# Patient Record
Sex: FEMALE | Race: WHITE | Employment: FULL TIME | RURAL
[De-identification: names, ages, dates, MRNs, and addresses within clinical notes are randomized per-mention and may not be internally consistent; named-entity substitution may affect disease eponyms.]

---

## 2024-06-28 ENCOUNTER — OFFICE VISIT (OUTPATIENT)
Age: 24
End: 2024-06-28
Payer: COMMERCIAL

## 2024-06-28 VITALS
SYSTOLIC BLOOD PRESSURE: 128 MMHG | DIASTOLIC BLOOD PRESSURE: 84 MMHG | RESPIRATION RATE: 18 BRPM | WEIGHT: 277.4 LBS | BODY MASS INDEX: 51.05 KG/M2 | OXYGEN SATURATION: 98 % | HEART RATE: 99 BPM | TEMPERATURE: 97.5 F | HEIGHT: 62 IN

## 2024-06-28 DIAGNOSIS — R30.0 DYSURIA: Primary | ICD-10-CM

## 2024-06-28 LAB
BILIRUBIN, URINE, POC: NEGATIVE
BLOOD URINE, POC: NEGATIVE
GLUCOSE URINE, POC: NEGATIVE
KETONES, URINE, POC: NEGATIVE
LEUKOCYTE ESTERASE, URINE, POC: NEGATIVE
NITRITE, URINE, POC: NEGATIVE
PH, URINE, POC: 7.5 (ref 4.6–8)
PROTEIN,URINE, POC: NEGATIVE
SPECIFIC GRAVITY, URINE, POC: 1.02 (ref 1–1.03)
URINALYSIS CLARITY, POC: CLEAR
URINALYSIS COLOR, POC: YELLOW
UROBILINOGEN, POC: NORMAL

## 2024-06-28 PROCEDURE — 81001 URINALYSIS AUTO W/SCOPE: CPT | Performed by: FAMILY MEDICINE

## 2024-06-28 PROCEDURE — 99213 OFFICE O/P EST LOW 20 MIN: CPT | Performed by: FAMILY MEDICINE

## 2024-06-28 RX ORDER — BUTALBITAL, ACETAMINOPHEN AND CAFFEINE 50; 325; 40 MG/1; MG/1; MG/1
TABLET ORAL
COMMUNITY
Start: 2024-06-26

## 2024-06-28 SDOH — ECONOMIC STABILITY: INCOME INSECURITY: HOW HARD IS IT FOR YOU TO PAY FOR THE VERY BASICS LIKE FOOD, HOUSING, MEDICAL CARE, AND HEATING?: NOT HARD AT ALL

## 2024-06-28 SDOH — ECONOMIC STABILITY: TRANSPORTATION INSECURITY
IN THE PAST 12 MONTHS, HAS LACK OF TRANSPORTATION KEPT YOU FROM MEETINGS, WORK, OR FROM GETTING THINGS NEEDED FOR DAILY LIVING?: NO

## 2024-06-28 SDOH — ECONOMIC STABILITY: HOUSING INSECURITY
IN THE LAST 12 MONTHS, WAS THERE A TIME WHEN YOU DID NOT HAVE A STEADY PLACE TO SLEEP OR SLEPT IN A SHELTER (INCLUDING NOW)?: NO

## 2024-06-28 SDOH — ECONOMIC STABILITY: FOOD INSECURITY: WITHIN THE PAST 12 MONTHS, THE FOOD YOU BOUGHT JUST DIDN'T LAST AND YOU DIDN'T HAVE MONEY TO GET MORE.: NEVER TRUE

## 2024-06-28 SDOH — ECONOMIC STABILITY: FOOD INSECURITY: WITHIN THE PAST 12 MONTHS, YOU WORRIED THAT YOUR FOOD WOULD RUN OUT BEFORE YOU GOT MONEY TO BUY MORE.: NEVER TRUE

## 2024-06-28 ASSESSMENT — ENCOUNTER SYMPTOMS
RHINORRHEA: 0
DIARRHEA: 0
SHORTNESS OF BREATH: 0
SORE THROAT: 0
SINUS PAIN: 0
ABDOMINAL PAIN: 0
ANAL BLEEDING: 0
ABDOMINAL DISTENTION: 0
COUGH: 0
CHEST TIGHTNESS: 0
WHEEZING: 0
VOMITING: 0
NAUSEA: 0
BLOOD IN STOOL: 0
BACK PAIN: 0
CONSTIPATION: 0
SINUS PRESSURE: 0

## 2024-06-28 NOTE — PROGRESS NOTES
Assessment & Plan   1. Dysuria  -     AMB POC URINALYSIS DIP STICK AUTO W/ MICRO       No follow-ups on file.       Subjective   Elidia Alvarez (:  2000) is a 24 y.o. female,Established patient, here for evaluation of the following chief complaint(s):  Migraine (Patient has always had migraines but now is worse that she is pregnant and unable to take medication. Saturday had bright spots and blurred vision and is concerned )      Elidia Alvarez is a 24 y.o. female presents to discuss symptoms of headache, nasal congestion, and allergy flare up.     She is 19 weeks pregnant. Has a follow up with OB/GYN on 2024    Migraine   Pertinent negatives include no abdominal pain, back pain, coughing, fever, nausea, neck pain, rhinorrhea, sinus pressure, sore throat or vomiting.       Review of Systems   Constitutional:  Negative for activity change, appetite change, fatigue and fever.   HENT:  Negative for congestion, postnasal drip, rhinorrhea, sinus pressure, sinus pain, sneezing and sore throat.    Respiratory:  Negative for cough, chest tightness, shortness of breath and wheezing.    Cardiovascular:  Negative for chest pain, palpitations and leg swelling.   Gastrointestinal:  Negative for abdominal distention, abdominal pain, anal bleeding, blood in stool, constipation, diarrhea, nausea and vomiting.   Endocrine: Negative for cold intolerance, heat intolerance, polydipsia, polyphagia and polyuria.   Genitourinary:  Negative for dyspareunia, genital sores, hematuria, menstrual problem, pelvic pain, vaginal bleeding, vaginal discharge and vaginal pain.   Musculoskeletal:  Negative for arthralgias, back pain, gait problem, joint swelling and neck pain.   Skin:  Negative for pallor, rash and wound.   Allergic/Immunologic: Negative for environmental allergies, food allergies and immunocompromised state.   Hematological:  Negative for adenopathy. Does not bruise/bleed easily.

## 2024-06-28 NOTE — PROGRESS NOTES
Identified pt with two pt identifiers(name and ).    Chief Complaint   Patient presents with    Migraine     Patient has always had migraines but now is worse that she is pregnant and unable to take medication. Saturday had bright spots and blurred vision and is concerned         Health Maintenance Due   Topic    Varicella vaccine (2 of 2 - 2-dose childhood series)    HIV screen     HPV vaccine (2 - 3-dose series)    COVID-19 Vaccine ( season)       Wt Readings from Last 3 Encounters:   24 126.1 kg (278 lb)   23 120.2 kg (265 lb)   23 120.7 kg (266 lb 3.2 oz)     Temp Readings from Last 3 Encounters:   24 98.5 °F (36.9 °C)   23 98.4 °F (36.9 °C) (Oral)   23 98.1 °F (36.7 °C)     BP Readings from Last 3 Encounters:   24 117/85   23 128/77   23 (!) 150/90     Pulse Readings from Last 3 Encounters:   24 98   23 71   23 83           Depression Screening:  :         2024     1:38 PM 2023    11:31 AM 3/29/2023     9:30 AM 2023     3:38 PM 10/24/2022     2:48 PM 10/10/2022    10:00 AM 3/14/2022     4:14 PM   PHQ-9 Questionaire   Little interest or pleasure in doing things 0 1 0 1 1 0 0   Feeling down, depressed, or hopeless 0 1 0 1 1 1 0   Trouble falling or staying asleep, or sleeping too much 0   3      Feeling tired or having little energy 0   3      Poor appetite or overeating 0   1      Feeling bad about yourself - or that you are a failure or have let yourself or your family down 0   0      Trouble concentrating on things, such as reading the newspaper or watching television 0   0      Moving or speaking so slowly that other people could have noticed. Or the opposite - being so fidgety or restless that you have been moving around a lot more than usual 0   0      Thoughts that you would be better off dead, or of hurting yourself in some way 0         PHQ-9 Total Score 0 2 0 9 2 1 0   If you checked off any problems, how

## 2024-07-17 DIAGNOSIS — E28.2 PCOS (POLYCYSTIC OVARIAN SYNDROME): ICD-10-CM

## 2024-07-17 RX ORDER — METFORMIN HYDROCHLORIDE 500 MG/1
TABLET, EXTENDED RELEASE ORAL
Qty: 180 TABLET | Refills: 0 | Status: SHIPPED | OUTPATIENT
Start: 2024-07-17

## 2024-08-21 ENCOUNTER — OFFICE VISIT (OUTPATIENT)
Age: 24
End: 2024-08-21
Payer: COMMERCIAL

## 2024-08-21 VITALS
DIASTOLIC BLOOD PRESSURE: 76 MMHG | BODY MASS INDEX: 52.89 KG/M2 | HEART RATE: 76 BPM | SYSTOLIC BLOOD PRESSURE: 120 MMHG | RESPIRATION RATE: 17 BRPM | WEIGHT: 287.4 LBS | TEMPERATURE: 97.7 F | OXYGEN SATURATION: 98 % | HEIGHT: 62 IN

## 2024-08-21 DIAGNOSIS — Z3A.27 27 WEEKS GESTATION OF PREGNANCY: ICD-10-CM

## 2024-08-21 DIAGNOSIS — J02.9 SORE THROAT: Primary | ICD-10-CM

## 2024-08-21 DIAGNOSIS — R59.9 LYMPH NODE ENLARGEMENT: ICD-10-CM

## 2024-08-21 LAB
GROUP A STREP ANTIGEN, POC: NEGATIVE
VALID INTERNAL CONTROL, POC: NORMAL

## 2024-08-21 PROCEDURE — 87880 STREP A ASSAY W/OPTIC: CPT | Performed by: INTERNAL MEDICINE

## 2024-08-21 PROCEDURE — 99213 OFFICE O/P EST LOW 20 MIN: CPT | Performed by: INTERNAL MEDICINE

## 2024-08-21 NOTE — PROGRESS NOTES
[facility]   Acute Visit Progress Note  Patient: Elidia Alvarez  2000, 24 y.o., female  Encounter Date: 8/21/24    CHIEF COMPLAINT:  Chief Complaint   Patient presents with    URI     Headache, sore throat and pain on the left side of her face since Sunday. Patient is 27 weeks pregnant         ASSESSMENT & PLAN:    ICD-10-CM    1. Sore throat  J02.9 AMB POC RAPID STREP A      2. Lymph node enlargement  R59.9       3. 27 weeks gestation of pregnancy  Z3A.27         Strep was negative. Symptoms are suggestive of viral illness and mild GIDEON.   Will treat supportively.   I discussed that a viral illness usually starts with mild sore throat. It then can lead to cough and congestion with nasal congestion being predominant. The cough and congestion can last for about 2-weeks. However, if fever curve increases 4-5 days after onset of symptoms, then the dynamics have changed and it could be a bacterial infection. That would be the reason to start antibiotics. She verbalized understanding the plan.     I have discussed the diagnosis with the patient and the intended treatment plan as seen in the above orders. The patient has received an after-visit summary and questions were answered concerning future plans. Asked to return should symptoms worsen or not improve with treatment. Any pending labs and studies will be relayed to patient when they become available.     Pt verbalizes understanding of plan of care and denies further questions or concerns at this time    Return if symptoms worsen or fail to improve.    SUBJECTIVE  Elidia Alvarez is a 24 y.o. female presenting today for onset of URI symptoms and swelling along the right side of her neck. Feels like she has a sore throat as well. She denies fever or chills. Worried about strep throat.     Review of Systems   Constitutional:  Positive for activity change.   HENT:  Positive for sore throat.    Eyes: Negative.    Respiratory: Negative.

## 2024-08-21 NOTE — PATIENT INSTRUCTIONS
I discussed that a viral illness usually starts with mild sore throat. It then can lead to cough and congestion with nasal congestion being predominant. The cough and congestion can last for about 2-weeks. However, if fever curve increases 4-5 days after onset of symptoms, then the dynamics have changed and it could be a bacterial infection. That would be the reason to start antibiotics. She verbalized understanding the plan.

## 2024-08-21 NOTE — PROGRESS NOTES
Identified pt with two pt identifiers(name and ).    Chief Complaint   Patient presents with    URI     Headache, sore throat and pain on the left side of her face since . Patient is 27 weeks pregnant         Health Maintenance Due   Topic    Varicella vaccine (2 of 2 - 2-dose childhood series)    HIV screen     HPV vaccine (2 - 3-dose series)    COVID-19 Vaccine ( season)    Flu vaccine (1)    Tdap Vaccine during Pregnancy        Wt Readings from Last 3 Encounters:   24 125.8 kg (277 lb 6.4 oz)   24 126.1 kg (278 lb)   23 120.2 kg (265 lb)     Temp Readings from Last 3 Encounters:   24 97.5 °F (36.4 °C) (Temporal)   24 98.5 °F (36.9 °C)   23 98.4 °F (36.9 °C) (Oral)     BP Readings from Last 3 Encounters:   24 128/84   24 117/85   23 128/77     Pulse Readings from Last 3 Encounters:   24 99   24 98   23 71           Depression Screening:  :         2024     1:38 PM 2023    11:31 AM 3/29/2023     9:30 AM 2023     3:38 PM 10/24/2022     2:48 PM 10/10/2022    10:00 AM 3/14/2022     4:14 PM   PHQ-9 Questionaire   Little interest or pleasure in doing things 0 1 0 1 1 0 0   Feeling down, depressed, or hopeless 0 1 0 1 1 1 0   Trouble falling or staying asleep, or sleeping too much 0   3      Feeling tired or having little energy 0   3      Poor appetite or overeating 0   1      Feeling bad about yourself - or that you are a failure or have let yourself or your family down 0   0      Trouble concentrating on things, such as reading the newspaper or watching television 0   0      Moving or speaking so slowly that other people could have noticed. Or the opposite - being so fidgety or restless that you have been moving around a lot more than usual 0   0      Thoughts that you would be better off dead, or of hurting yourself in some way 0         PHQ-9 Total Score 0 2 0 9 2 1 0   If you checked off any problems, how

## 2024-08-22 ASSESSMENT — ENCOUNTER SYMPTOMS
GASTROINTESTINAL NEGATIVE: 1
EYES NEGATIVE: 1
SORE THROAT: 1
RESPIRATORY NEGATIVE: 1
ALLERGIC/IMMUNOLOGIC NEGATIVE: 1

## 2024-10-04 DIAGNOSIS — E28.2 PCOS (POLYCYSTIC OVARIAN SYNDROME): ICD-10-CM

## 2024-10-14 RX ORDER — METFORMIN HCL 500 MG
TABLET, EXTENDED RELEASE 24 HR ORAL
Qty: 180 TABLET | Refills: 0 | Status: SHIPPED | OUTPATIENT
Start: 2024-10-14

## 2024-12-18 ENCOUNTER — TELEMEDICINE (OUTPATIENT)
Age: 24
End: 2024-12-18
Payer: COMMERCIAL

## 2024-12-18 DIAGNOSIS — G44.52 NEW DAILY PERSISTENT HEADACHE: Primary | ICD-10-CM

## 2024-12-18 PROCEDURE — 99214 OFFICE O/P EST MOD 30 MIN: CPT | Performed by: FAMILY MEDICINE

## 2024-12-18 RX ORDER — ATOGEPANT 60 MG/1
1 TABLET ORAL DAILY
Qty: 30 TABLET | Refills: 3 | Status: SHIPPED | OUTPATIENT
Start: 2024-12-18

## 2024-12-18 RX ORDER — PROPRANOLOL HYDROCHLORIDE 60 MG/1
60 CAPSULE, EXTENDED RELEASE ORAL DAILY
Qty: 30 CAPSULE | Refills: 3 | Status: SHIPPED | OUTPATIENT
Start: 2024-12-18

## 2024-12-18 RX ORDER — RIMEGEPANT SULFATE 75 MG/75MG
1 TABLET, ORALLY DISINTEGRATING ORAL DAILY
Qty: 30 TABLET | Refills: 3 | Status: SHIPPED | OUTPATIENT
Start: 2024-12-18

## 2024-12-18 NOTE — PROGRESS NOTES
better off dead, or of hurting yourself in some way 0         PHQ-9 Total Score 0 2 0 9 2 1 0   If you checked off any problems, how difficult have these problems made it for you to do your work, take care of things at home, or get along with other people? 0              Fall Risk Assessment:  :          No data to display                 Abuse Screening:  :          No data to display                 Coordination of Care Questionnaire:  :     \"Have you been to the ER, urgent care clinic since your last visit?  Hospitalized since your last visit?\"    NO    “Have you seen or consulted any other health care providers outside our system since your last visit?”    NO        Click Here for Release of Records Request

## 2025-01-10 DIAGNOSIS — E28.2 PCOS (POLYCYSTIC OVARIAN SYNDROME): ICD-10-CM

## 2025-01-10 RX ORDER — METFORMIN HYDROCHLORIDE 500 MG/1
TABLET, EXTENDED RELEASE ORAL
Qty: 180 TABLET | Refills: 0 | Status: SHIPPED | OUTPATIENT
Start: 2025-01-10

## 2025-02-14 ENCOUNTER — TELEMEDICINE (OUTPATIENT)
Age: 25
End: 2025-02-14
Payer: COMMERCIAL

## 2025-02-14 DIAGNOSIS — G43.011 MIGRAINE WITHOUT AURA, INTRACTABLE, WITH STATUS MIGRAINOSUS: Primary | ICD-10-CM

## 2025-02-14 PROCEDURE — 99213 OFFICE O/P EST LOW 20 MIN: CPT | Performed by: NURSE PRACTITIONER

## 2025-02-14 NOTE — PROGRESS NOTES
[facility]  [facility]  [facility]UROLOGY [facility]INIC   [address] [place] [address]   [phone] Office   [phone] Fax           Date:  25     Name:  BECCA ALVAREZ  :  2000  MRN:  [id number]     PCP:  Celestina Chu MD    Becca Alvarez is a 24 y.o. female who was seen by synchronous (real-time) audio-video technology on 2025 for Migraine    Subjective:   Last visit was in . At the time, she did just get  and they were looking to start a family right away.  She is now three months post partum. She states that during pregnancy, she did well with regard to migraine. About a week before delivery, she started having headaches. This was thought to be related high blood pressure. She was given magnesium IV which seemed to help but when this was stopped, the headaches started to come back.  She was having migraines daily. About six weeks ago, she was given Qulipta. She is now down to only one migraine a week and has only had two migraines that had her reaching for the Nurtec. The Nurtec does effectively abort the migraine.     Current Outpatient Medications   Medication Sig    metFORMIN (GLUCOPHAGE-XR) 500 MG extended release tablet TAKE 2 TABLETS BY MOUTH DAILY WITH DINNER    rimegepant sulfate (NURTEC) 75 MG TBDP Take 1 tablet by mouth Daily    Atogepant (QULIPTA) 60 MG TABS Take 1 tablet by mouth daily    propranolol (INDERAL LA) 60 MG extended release capsule Take 1 capsule by mouth daily    Azelaic Acid 15 % GEL APPLY TOPICALLY TO FACE EVERY MORNING    clindamycin (CLINDAGEL) 1 % gel APPLY TOPICALLY TO FACE EVERY NIGHT    azelastine (ASTELIN) 0.1 % nasal spray 1 spray by Nasal route 2 times daily    cetirizine (ZYRTEC) 10 MG tablet Take 1 tablet by mouth every evening    Citalopram Hydrobromide 30 MG CAPS Take 20 mg by mouth daily     No current facility-administered medications for this visit.

## 2025-03-25 ENCOUNTER — TELEPHONE (OUTPATIENT)
Age: 25
End: 2025-03-25

## 2025-03-25 NOTE — TELEPHONE ENCOUNTER
Patient requested a refill from her pharmacy, her bottle says that she has 3 refills left before December of 2025. But refill was denied, due to her needing a PA. She is completely out of this medication and needs a refill asap. She is requesting a PA be submitted asap.     Atogepant 60 MG      [facility] AT [address] [place] [phone] - F [phone]

## 2025-04-03 ENCOUNTER — TELEPHONE (OUTPATIENT)
Age: 25
End: 2025-04-03

## 2025-04-03 NOTE — TELEPHONE ENCOUNTER
Atogepant (QULIPTA) 60 MG TABS     Pt wants to know what is the status of the PA for this? As she has been out of medication for awhile      [facility] at [place], [place] - [address] [place] [phone] - F [phone]

## 2025-04-07 DIAGNOSIS — G44.52 NEW DAILY PERSISTENT HEADACHE: ICD-10-CM

## 2025-04-08 ENCOUNTER — TELEPHONE (OUTPATIENT)
Age: 25
End: 2025-04-08

## 2025-04-08 RX ORDER — ATOGEPANT 60 MG/1
1 TABLET ORAL DAILY
Qty: 30 TABLET | Refills: 3 | Status: SHIPPED | OUTPATIENT
Start: 2025-04-08

## 2025-04-08 NOTE — TELEPHONE ENCOUNTER
Atogepant (QULIPTA) 60 MG TABS        [facility] at [address] [place] [phone] - [place] [phone]       This has been going on for over 2 weeks.   Pt is wondering the status of the Prior Auth. Please let me know so I can contact patient as she has been out of medication for weeks.

## 2025-04-23 DIAGNOSIS — E28.2 PCOS (POLYCYSTIC OVARIAN SYNDROME): ICD-10-CM

## 2025-04-24 RX ORDER — METFORMIN HYDROCHLORIDE 500 MG/1
TABLET, EXTENDED RELEASE ORAL
Qty: 180 TABLET | Refills: 0 | Status: SHIPPED | OUTPATIENT
Start: 2025-04-24

## 2025-05-06 ENCOUNTER — TELEPHONE (OUTPATIENT)
Age: 25
End: 2025-05-06

## 2025-05-06 DIAGNOSIS — G44.52 NEW DAILY PERSISTENT HEADACHE: ICD-10-CM

## 2025-05-06 RX ORDER — RIMEGEPANT SULFATE 75 MG/75MG
1 TABLET, ORALLY DISINTEGRATING ORAL PRN
Qty: 16 TABLET | Refills: 3 | Status: ACTIVE | OUTPATIENT
Start: 2025-05-06

## 2025-05-06 RX ORDER — ATOGEPANT 60 MG/1
1 TABLET ORAL DAILY
Qty: 30 TABLET | Refills: 3 | Status: ACTIVE | OUTPATIENT
Start: 2025-05-06

## 2025-05-06 RX ORDER — RIMEGEPANT SULFATE 75 MG/75MG
1 TABLET, ORALLY DISINTEGRATING ORAL DAILY
Qty: 30 TABLET | Refills: 3 | Status: SHIPPED | OUTPATIENT
Start: 2025-05-06 | End: 2025-05-06 | Stop reason: SDUPTHER

## 2025-05-06 NOTE — TELEPHONE ENCOUNTER
Maxine patient is asking if you will write for Nurtec, and Qulipta.  She has active scripts written from her PCP, but they're refusing to get a PA for her.    She is going to change PCPs but hasn't been able to do that just yet.    Please let patient know if you will write for these medications.  She had a visit with you in Feb 2025.    Thanks.

## 2025-08-01 ENCOUNTER — OFFICE VISIT (OUTPATIENT)
Age: 25
End: 2025-08-01
Payer: COMMERCIAL

## 2025-08-01 VITALS — SYSTOLIC BLOOD PRESSURE: 128 MMHG | DIASTOLIC BLOOD PRESSURE: 80 MMHG | RESPIRATION RATE: 20 BRPM

## 2025-08-01 DIAGNOSIS — G44.52 NEW DAILY PERSISTENT HEADACHE: ICD-10-CM

## 2025-08-01 DIAGNOSIS — G43.011 MIGRAINE WITHOUT AURA, INTRACTABLE, WITH STATUS MIGRAINOSUS: Primary | ICD-10-CM

## 2025-08-01 PROCEDURE — 99214 OFFICE O/P EST MOD 30 MIN: CPT

## 2025-08-01 RX ORDER — ATOGEPANT 60 MG/1
1 TABLET ORAL DAILY
Qty: 30 TABLET | Refills: 5 | Status: ACTIVE | OUTPATIENT
Start: 2025-08-01

## 2025-08-01 RX ORDER — LISDEXAMFETAMINE DIMESYLATE 10 MG/1
10 CAPSULE ORAL DAILY
COMMUNITY
Start: 2025-07-22

## 2025-08-01 ASSESSMENT — ENCOUNTER SYMPTOMS
NAUSEA: 1
PHOTOPHOBIA: 1

## 2025-08-01 NOTE — PROGRESS NOTES
In the past 2 weeks very persistent migraines and headaches   In the last week some dizziness as well, maybe due to an increase in her anxiety medication but wanted to follow up with us to make sure

## 2025-08-01 NOTE — PROGRESS NOTES
Elidia Alvarez is a 25 y.o. female who presents with the following  Chief Complaint   Patient presents with    Follow-up     Migraines        HPI  Patient is here today for migraine follow-up.  Patient was last seen February 14, 2025 by Maxine Wolfe NP at which time the patient had restarted Qulipta for migraine prevention and Nurtec for rescue therapy after a successful pregnancy and delivery.  She reported daily migraines before restarting Qulipta and then once taking the medication they reduced to 1 migraine a week and Nurtec was still working well for her to abort her migraine within 30 minutes or so.    Today the patient tells me that she has been doing well with the medications on board until about 2 weeks ago where they became more persistent occurring almost daily.  She tells me that these migraines are not severe in nature but she has been taking Nurtec for them.Nurtec works well within 30 minutes to abort.  She tells me that before this increase occurred she was having about 1 or 2 a week and about 4 or 5 severe migraines a month with photophobia, phonophobia, nausea.  These headaches occur behind her bilateral eyes and feel like pressure in nature.  She says that the pain can move to the base of her skull as well.  She does get neuromuscular massages which is helpful.    The patient does tell me today that yesterday she saw her primary care and her blood pressure was 104/70.  She has been experiencing some dizziness with her increase in headaches.  Primary care did orthostatics in the office and did not see any big changes in the blood pressure.  Patient's primary care suggested that she see neurology and have a scan of the brain ordered.  The only other changes that the patient has had recently is citalopram increases which she tells me does not exactly correlate to when the increase in headaches occurred.  Allergies   Allergen Reactions    Ondansetron Hives, Itching and Rash    Ondansetron Hcl